# Patient Record
Sex: FEMALE | NOT HISPANIC OR LATINO | Employment: OTHER | URBAN - METROPOLITAN AREA
[De-identification: names, ages, dates, MRNs, and addresses within clinical notes are randomized per-mention and may not be internally consistent; named-entity substitution may affect disease eponyms.]

---

## 2018-07-03 ENCOUNTER — ANTICOAG VISIT (OUTPATIENT)
Dept: CARDIOLOGY CLINIC | Facility: CLINIC | Age: 83
End: 2018-07-03

## 2018-07-03 LAB — INR PPP: 2.9 (ref 0.86–1.17)

## 2018-08-06 ENCOUNTER — OFFICE VISIT (OUTPATIENT)
Dept: CARDIOLOGY CLINIC | Facility: CLINIC | Age: 83
End: 2018-08-06
Payer: COMMERCIAL

## 2018-08-06 VITALS
HEIGHT: 65 IN | DIASTOLIC BLOOD PRESSURE: 70 MMHG | BODY MASS INDEX: 25.66 KG/M2 | SYSTOLIC BLOOD PRESSURE: 130 MMHG | RESPIRATION RATE: 18 BRPM | WEIGHT: 154 LBS | HEART RATE: 72 BPM | OXYGEN SATURATION: 95 %

## 2018-08-06 DIAGNOSIS — I35.1 AORTIC VALVE INSUFFICIENCY, ETIOLOGY OF CARDIAC VALVE DISEASE UNSPECIFIED: ICD-10-CM

## 2018-08-06 DIAGNOSIS — Z95.2 S/P MITRAL VALVE REPLACEMENT: ICD-10-CM

## 2018-08-06 DIAGNOSIS — I27.20 PULMONARY HYPERTENSION, MILD (HCC): ICD-10-CM

## 2018-08-06 DIAGNOSIS — I48.0 PAROXYSMAL ATRIAL FIBRILLATION (HCC): Primary | ICD-10-CM

## 2018-08-06 DIAGNOSIS — I10 BENIGN ESSENTIAL HYPERTENSION: ICD-10-CM

## 2018-08-06 PROBLEM — I49.5 TACHYCARDIA-BRADYCARDIA SYNDROME (HCC): Status: ACTIVE | Noted: 2018-08-06

## 2018-08-06 PROCEDURE — 99214 OFFICE O/P EST MOD 30 MIN: CPT | Performed by: INTERNAL MEDICINE

## 2018-08-06 PROCEDURE — 93000 ELECTROCARDIOGRAM COMPLETE: CPT | Performed by: INTERNAL MEDICINE

## 2018-08-06 RX ORDER — ROSUVASTATIN CALCIUM 10 MG/1
10 TABLET, COATED ORAL
COMMUNITY
Start: 2010-05-08

## 2018-08-06 RX ORDER — WARFARIN SODIUM 2 MG/1
TABLET ORAL
COMMUNITY
Start: 2018-01-02 | End: 2019-03-07 | Stop reason: SDUPTHER

## 2018-08-06 RX ORDER — NICOTINE POLACRILEX 2 MG
GUM BUCCAL
COMMUNITY
End: 2018-08-06 | Stop reason: ALTCHOICE

## 2018-08-06 RX ORDER — LOSARTAN POTASSIUM 100 MG/1
50 TABLET ORAL DAILY
Refills: 3 | COMMUNITY
Start: 2018-05-11

## 2018-08-06 RX ORDER — MAGNESIUM GLUCONATE 30 MG(550)
250 TABLET ORAL
COMMUNITY

## 2018-08-06 RX ORDER — SOTALOL HYDROCHLORIDE 80 MG/1
80 TABLET ORAL 2 TIMES DAILY
COMMUNITY
Start: 2018-08-05 | End: 2018-08-09 | Stop reason: SDUPTHER

## 2018-08-06 RX ORDER — DICYCLOMINE HCL 20 MG
20 TABLET ORAL AS NEEDED
COMMUNITY
Start: 2010-05-08

## 2018-08-06 NOTE — ASSESSMENT & PLAN NOTE
Patient recently seems to be having tachycardia bradycardia syndrome and paroxysmal atrial fibrillation  Her renal function is normal   She is on a reasonable dose of sotalol  She previously had tried amiodarone but did not continue it because of increased bruising  There was no true amiodarone toxicity diagnosed the past   Recent electrolytes were stable  -   At this time we are continuing her current dose of sotalol along with  Verapamil  We are placing event monitor for 2 weeks to assess her symptoms and for monitoring her rhythm  -   If she has paroxysms of atrial fibrillation without bradycardia then there will be room to go up on dose of sotalol to 120 mg twice daily  However if she has bradycardia associated then we will have to consider weaning her off sotalol and replacing with amiodarone or putting a pacemaker  She has requested follow-up with electrophysiologist Dr Lutz Sep  Take care of her  in the past   We will facilitate this follow-up  -   I am advising her to maintain a close diary of symptoms and monitor them and report to us if anything concerning  Especially if she is having near-syncope or syncope events  -    Continue anticoagulation on warfarin with goal INR 2 5-3 5

## 2018-08-06 NOTE — PROGRESS NOTES
[address]    [address] [place] [address] [address]15, [address]  50  [address]  Phone#  [phone]  Fax#  [phone]  *-*-*-*-*-*-*-*-*-*-*-*-*-*-*-*-*-*-*-*-*-*-*-*-*-*-*-*-*-*-*-*-*-*-*-*-*-*-*-*-*-*-*-*-*-*-*-*-*-*-*-*-*-*    Leoncio Olvera DATE: 08/06/18 4:43 PM    PATIENT NAME: Jorge Lopez   1935    [id number]  Age: 81 y o  Sex: female    AUTHOR: Adam Ritter MD  [facility] PHYSICIAN: Hugo Mace    DIAGNOSES:  1  History of rheumatic heart disease status post mechanical mitral valve placement in 2004  2  Paroxysmal atrial fibrillation  3  Pulmonary hypertension  4  Dyslipidemia  5  History of hysterectomy  6  History of cholecystectomy appendectomy  7  History of tonsillectomy  8  History of hemorrhoid surgery  9  Benign essential hypertension  10  Aortic valve regurgitation  11  Impaired fasting glucose  Echocardiogram in December 2016 showed normal left ventricular systolic function, mild pulmonary hypertension with PA pressure of 43, normal functioning mitral valve prosthesis, moderate aortic valve regurgitation, mild tricuspid valve regurgitation  CURRENT ECG:  Results for orders placed or performed in visit on 08/06/18   POCT ECG    Narrative    Sinus rhythm with sinus arrhythmia and premature atrial complexes, HR 72 beats per min, normal axis and intervals, nonspecific ST T wave abnormalities  QTC is 445 milliseconds  CARDIOLOGY ASSESSMENT & PLAN:  Paroxysmal atrial fibrillation [facility])   Patient recently seems to be having tachycardia bradycardia syndrome and paroxysmal atrial fibrillation  Her renal function is normal   She is on a reasonable dose of sotalol  She previously had tried amiodarone but did not continue it because of increased bruising  There was no true amiodarone toxicity diagnosed the past   Recent electrolytes were stable  -   At this time we are continuing her current dose of sotalol along with  Verapamil    We are placing event monitor for 2 weeks to assess her symptoms and for monitoring her rhythm  -   If she has paroxysms of atrial fibrillation without bradycardia then there will be room to go up on dose of sotalol to 120 mg twice daily  However if she has bradycardia associated then we will have to consider weaning her off sotalol and replacing with amiodarone or putting a pacemaker  She has requested follow-up with electrophysiologist Dr Janki Cueva  Take care of her  in the past   We will facilitate this follow-up  -   I am advising her to maintain a close diary of symptoms and monitor them and report to us if anything concerning  Especially if she is having near-syncope or syncope events  -    Continue anticoagulation on warfarin with goal INR 2 5-3 5  Benign essential hypertension    Blood pressure is well controlled on lower dose of losartan along with verapamil ER along with sotalol       - Patient is advised to continue current medical therapy  - Dietary and medical compliance are reinforced  - Advised  to report any concerning symptoms such as chest pain, shortness of breath, decline in exercise tolerance or presyncope/syncope  INTERVAL HISTORY & HISTORY OF PRESENT ILLNESS:    Patient is here for follow-up accompanied with her   She has had  2 recent hospitalizations for irregular heartbeat  She presented to [facility] on August 1, 2018 with one-day history of irregular heartbeat and palpitations  She was noted in the emergency room to have frequent premature atrial contractions with intermittent sinus bradycardia rhythm  She was admitted to the hospital and her verapamil was stopped  After observation for 30 hr there was no occurrence of atrial fibrillation and her heart rhythm seemed to be normal and she was discharged home on August 3, 2018    The following day she developed rapid heart rate and was again evaluated in the emergency room and was found to have atrial fibrillation with rapid ventricular response  She was admitted to the hospital and during this time verapamil was restarted but at a reduced dose  She was observed for 23 hr and  Spontaneously converted to sinus rhythm  Given recent rhythm disturbance  She was advised to follow up with cardiac electrophysiologists but appointment was not made  Today she reports that since coming home yesterday overnight she has felt irregular heartbeat but no sustained palpitations  This morning also she was feeling that her heart rate was irregular  She took her morning dose of verapamil and amiodarone and an hour after that she seemed to become normal in her rhythm  Since morning she feels all right  Episodes of atrial fibrillation are accompanied with fatigue and dizziness  She has noticed some decline in exercise tolerance and has intermittent palpitations  Has had no presyncope / syncope or falls  Has been compliant with other medications  No recent bleeding or bruising noted  She has had no recent echocardiogram   Denies orthopnea, PND or pedal edema  She reports that she used to be on amiodarone in the remote past following her mitral valve surgery however she stopped taking that medication around this that time because she noted some bruising in in her legs and there was concern of amiodarone causing the bruising  She has had no specific amiodarone toxicity symptoms  REVIEW OF SYMPTOMS:    Positive for:    Palpitations, fatigue, dizziness  Negative for: All remaining as reviewed below and in HPI  SYSTEM SYMPTOMS REVIEWED:  General--weight change, fever, night sweats  Respirato      Cardiovascular--chest pain, syncope, dyspnea on exertion, edema, decline in exercise tolerance, claudication   Gastrointestinal--persistent vomiting, diarrhea, abdominal distention, blood in stool   Muscular or skeletal--joint pain or swelling   Neurologic--headaches, syncope, abnormal movement  Hematologic--history of easy bruising and bleeding   Endocrine--thyroid enlargement, heat or cold intolerance, polyuria   Psychiatric--anxiety, depression     *-*-*-*-*-*-*-*-*-*-*-*-*-*-*-*-*-*-*-*-*-*-*-*-*-*-*-*-*-*-*-*-*-*-*-*-*-*-*-*-*-*-*-*-*-*-*-*-*-*-*-*-*-*-  VITAL SIGNS:  Vitals:    08/06/18 1530   BP: 130/70   BP Location: Left arm   Patient Position: Sitting   Cuff Size: Large   Pulse: 72   Resp: 18   SpO2: 95%   Weight: 69 9 kg (154 lb)   Height: 5' 5" (1 651 m)       *-*-*-*-*-*-*-*-*-*-*-*-*-*-*-*-*-*-*-*-*-*-*-*-*-*-*-*-*-*-*-*-*-*-*-*-*-*-*-*-*-*-*-*-*-*-*-*-*-*-*-*-*-*-  PHYSICAL EXAM:  General Appearance:    Alert, cooperative, no distress, appears stated age  Head, Eyes, ENT:    No obvious abnormality, moist mucous mebranes  Neck:   Supple, no carotid bruit or JVD   Back:     Symmetric, no curvature  Lungs:     Respirations unlabored  Clear to auscultation bilaterally,    Chest wall:    No tenderness or deformity   Heart:      Regular rhythm with premature beats  Brisk mechanical heart sounds  Abdomen:     Soft, non-tender, No obvious masses, or organomegaly   Extremities:   Extremities normal, no cyanosis or edema    Skin:    She has extensive bruising noted on her skin relating to recent blood draws and being on anticoag     *-*-*-*-*-*-*-*-*-*-*-*-*-*-*-*-*-*-*-*-*-*-*-*-*-*-*-*-*-*-*-*-*-*-*-*-*-*-*-*-*-*-*-*-*-*-*-*-*-*-*-*-*-*-  CURRENT MEDICATION LIST:    Current Outpatient Prescriptions:     Cholecalciferol 2000 units TABS, daily  , Disp: , Rfl:     dicyclomine (BENTYL) 20 mg tablet, Take 20 mg by mouth Three times a day, Disp: , Rfl:     LACTOBACILLUS BIFIDUS PO, 3 (three) times a week , Disp: , Rfl:     losartan (COZAAR) 100 MG tablet, Take 50 mg by mouth daily  , Disp: , Rfl: 3    Magnesium Gluconate 550 MG TABS, Take 250 mg by mouth  , Disp: , Rfl:     Multiple Vitamins-Minerals (MULTIVITAMIN ADULT PO), Take 1 tablet by mouth, Disp: , Rfl:     rosuvastatin (CRESTOR) 10 MG tablet, Take 10 mg by mouth, Disp: , Rfl:     sotalol (BETAPACE) 80 mg tablet, Take 80 mg by mouth 2 (two) times a day  , Disp: , Rfl:     verapamil (CALAN-SR) 120 mg CR tablet, Take 120 mg by mouth, Disp: , Rfl:     warfarin (COUMADIN) 2 mg tablet, TAKE 1 & 1/2 TABLETS DAILY  , Disp: , Rfl:     ALLERGIES:  Allergies   Allergen Reactions    Amiodarone Other (See Comments)     Discoloration around the ankles       *-*-*-*-*-*-*-*-*-*-*-*-*-*-*-*-*-*-*-*-*-*-*-*-*-*-*-*-*-*-*-*-*-*-*-*-*-*-*-*-*-*-*-*-*-*-*-*-*-*-*-*-*-*-    LABORATORY DATA:  CMP RESULTS:  No results found for: BUN, CREATININE, NA, K, CO2, CL, GLU, ALB, AST, ALT, PROT    CBC RESULTS:  No results found for: WBC, HGB, HCT, PLT    COAGULATION STUDIES:  Lab Results   Component Value Date/Time    INR 2 90 (A) 07/03/2018       THYROID FUNCTION TESTS:  No results found for: TSH, T4, T3     CARDIAC MARKERS:  No results found for: CKMB, BNP, DIGOXIN     LIPID PANEL: No results found for: CHOL, HDL, CHOLHDLRAT, LDL, TRIG     HEMOGLOBIN A1C:  No results found for: HGBA1C    *-*-*-*-*-*-*-*-*-*-*-*-*-*-*-*-*-*-*-*-*-*-*-*-*-*-*-*-*-*-*-*-*-*-*-*-*-*-*-*-*-*-*-*-*-*-*-*-*-*-*-*-*-*-  RADIOLOGY RESULTS:  No results found for this or any previous visit  No Chest XR results available for this patient  No CT Chest,Abdomen,Pelvis results available for this patient  No procedure found  *-*-*-*-*-*-*-*-*-*-*-*-*-*-*-*-*-*-*-*-*-*-*-*-*-*-*-*-*-*-*-*-*-*-*-*-*-*-*-*-*-*-*-*-*-*-*-*-*-*-*-*-*-*-  SIGNATURES:   @JVL@   Hailee Conroy MD     CC:   Nelson Le   Self, Referral   *-*-*-*-*-*-*-*-*-*-*-*-*-*-*-*-*-*-*-*-*-*-*-*-*-*-*-*-*-*-*-*-*-*-*-*-*-*-*-*-*-*-*-*-*-*-*-*-*-*-*-*-*-*-    Social History     Social History    Marital status: /Civil Union     Spouse name: N/A    Number of children: N/A    Years of education: N/A     Occupational History    Not on file       Social History Main Topics    Smoking status: Former Smoker    Smokeless tobacco: Never Used    Alcohol use No    Drug use: No    Sexual activity: Not on file     Other Topics Concern    Not on file     Social History Narrative    No narrative on file      No family history on file    Past Surgical History:   Procedure Laterality Date    APPENDECTOMY      CHOLECYSTECTOMY      HEMORROIDECTOMY      HYSTERECTOMY      MITRAL VALVE REPLACEMENT      TONSILLECTOMY

## 2018-08-06 NOTE — PATIENT INSTRUCTIONS
CARDIOLOGY ASSESSMENT & PLAN:  Paroxysmal atrial fibrillation [facility])   Patient recently seems to be having tachycardia bradycardia syndrome and paroxysmal atrial fibrillation  Her renal function is normal   She is on a reasonable dose of sotalol  She previously had tried amiodarone but did not continue it because of increased bruising  There was no true amiodarone toxicity diagnosed the past   Recent electrolytes were stable  -   At this time we are continuing her current dose of sotalol along with  Verapamil  We are placing event monitor for 2 weeks to assess her symptoms and for monitoring her rhythm  -   If she has paroxysms of atrial fibrillation without bradycardia then there will be room to go up on dose of sotalol to 120 mg twice daily  However if she has bradycardia associated then we will have to consider weaning her off sotalol and replacing with amiodarone or putting a pacemaker  She has requested follow-up with electrophysiologist Dr Devi Antis  Take care of her  in the past   We will facilitate this follow-up  -   I am advising her to maintain a close diary of symptoms and monitor them and report to us if anything concerning  Especially if she is having near-syncope or syncope events  -    Continue anticoagulation on warfarin with goal INR 2 5-3 5  Benign essential hypertension    Blood pressure is well controlled on lower dose of losartan along with verapamil ER along with sotalol       - Patient is advised to continue current medical therapy  - Dietary and medical compliance are reinforced  - Advised  to report any concerning symptoms such as chest pain, shortness of breath, decline in exercise tolerance or presyncope/syncope

## 2018-08-06 NOTE — ASSESSMENT & PLAN NOTE
Blood pressure is well controlled on lower dose of losartan along with verapamil ER along with sotalol       - Patient is advised to continue current medical therapy  - Dietary and medical compliance are reinforced  - Advised  to report any concerning symptoms such as chest pain, shortness of breath, decline in exercise tolerance or presyncope/syncope

## 2018-08-09 DIAGNOSIS — I10 HYPERTENSION, UNSPECIFIED TYPE: ICD-10-CM

## 2018-08-09 DIAGNOSIS — I48.0 PAROXYSMAL ATRIAL FIBRILLATION (HCC): Primary | ICD-10-CM

## 2018-08-09 RX ORDER — SOTALOL HYDROCHLORIDE 120 MG/1
120 TABLET ORAL 2 TIMES DAILY
Qty: 60 TABLET | Refills: 5 | Status: SHIPPED | OUTPATIENT
Start: 2018-08-09

## 2018-08-09 NOTE — TELEPHONE ENCOUNTER
Called patient per Dr Angel Kerns  Received [facility] event monitor report displaying that patient has paroxysmal a-fib and is symptomatic with these episodes  Dr Angel Kerns advised to increase sotalol to 120mg twice daily  Advised patient that prescription will be called into the pharmacy and will schedule a follow up EKG in 1 week of starting the new dosage  Patient was also referred to Dr Adan Lafleur and advised patient to call the office with any symptoms

## 2018-08-20 ENCOUNTER — TELEPHONE (OUTPATIENT)
Dept: CARDIOLOGY CLINIC | Facility: CLINIC | Age: 83
End: 2018-08-20

## 2018-08-20 NOTE — TELEPHONE ENCOUNTER
Called pt to notify her of her 's INR result and she stated that she has been feeling very tired and her heart rate is 48 while taking the sotalol 120 BID  Consulted with Dr Rob Mar, advised patient to take 120mg of Sotalol in the morning and only 80mg in the evening  Patient is to come in for a follow up EKG on Thursday and to call the office with any additional concerns

## 2018-08-23 ENCOUNTER — CLINICAL SUPPORT (OUTPATIENT)
Dept: CARDIOLOGY CLINIC | Facility: CLINIC | Age: 83
End: 2018-08-23
Payer: COMMERCIAL

## 2018-08-23 VITALS
RESPIRATION RATE: 18 BRPM | WEIGHT: 155.8 LBS | DIASTOLIC BLOOD PRESSURE: 62 MMHG | OXYGEN SATURATION: 97 % | HEIGHT: 65 IN | HEART RATE: 54 BPM | BODY MASS INDEX: 25.96 KG/M2 | SYSTOLIC BLOOD PRESSURE: 120 MMHG

## 2018-08-23 DIAGNOSIS — I48.0 PAROXYSMAL ATRIAL FIBRILLATION (HCC): Primary | ICD-10-CM

## 2018-08-23 PROCEDURE — 99211 OFF/OP EST MAY X REQ PHY/QHP: CPT

## 2018-08-23 PROCEDURE — 93000 ELECTROCARDIOGRAM COMPLETE: CPT

## 2018-08-23 RX ORDER — SOTALOL HYDROCHLORIDE 80 MG/1
80 TABLET ORAL DAILY
COMMUNITY

## 2018-08-23 NOTE — PATIENT INSTRUCTIONS
Patient came in for a follow up EKG since increasing  Sotalol from 80mg BID, to 80mg in the am and 120mg in the evening  EKG is in normal limits, reviewed with Dr Kiesha Dennison, advised to follow current medication doses and to return at next scheduled office visit, and to call the office with any cardiac symptoms

## 2018-09-17 ENCOUNTER — ANTICOAG VISIT (OUTPATIENT)
Dept: CARDIOLOGY CLINIC | Facility: CLINIC | Age: 83
End: 2018-09-17

## 2018-09-17 NOTE — PROGRESS NOTES
Patient is no longer following with our practice, INR is also being followed by this other practice

## 2019-03-07 DIAGNOSIS — I48.0 PAROXYSMAL ATRIAL FIBRILLATION (HCC): Primary | ICD-10-CM

## 2019-03-07 RX ORDER — WARFARIN SODIUM 2 MG/1
TABLET ORAL
Qty: 135 TABLET | Refills: 4 | Status: SHIPPED | OUTPATIENT
Start: 2019-03-07 | End: 2020-03-26

## 2020-03-24 DIAGNOSIS — I48.0 PAROXYSMAL ATRIAL FIBRILLATION (HCC): ICD-10-CM

## 2020-03-26 RX ORDER — WARFARIN SODIUM 2 MG/1
TABLET ORAL
Qty: 135 TABLET | Refills: 4 | Status: SHIPPED | OUTPATIENT
Start: 2020-03-26